# Patient Record
Sex: FEMALE | Race: WHITE | NOT HISPANIC OR LATINO
[De-identification: names, ages, dates, MRNs, and addresses within clinical notes are randomized per-mention and may not be internally consistent; named-entity substitution may affect disease eponyms.]

---

## 2021-03-18 ENCOUNTER — APPOINTMENT (OUTPATIENT)
Age: 31
End: 2021-03-18
Payer: COMMERCIAL

## 2021-03-18 PROCEDURE — 0001A: CPT

## 2021-12-20 ENCOUNTER — TRANSCRIPTION ENCOUNTER (OUTPATIENT)
Age: 31
End: 2021-12-20

## 2022-01-04 ENCOUNTER — TRANSCRIPTION ENCOUNTER (OUTPATIENT)
Age: 32
End: 2022-01-04

## 2022-01-06 ENCOUNTER — TRANSCRIPTION ENCOUNTER (OUTPATIENT)
Age: 32
End: 2022-01-06

## 2022-07-09 ENCOUNTER — NON-APPOINTMENT (OUTPATIENT)
Age: 32
End: 2022-07-09

## 2023-04-08 ENCOUNTER — NON-APPOINTMENT (OUTPATIENT)
Age: 33
End: 2023-04-08

## 2023-06-15 ENCOUNTER — NON-APPOINTMENT (OUTPATIENT)
Age: 33
End: 2023-06-15

## 2023-09-04 ENCOUNTER — NON-APPOINTMENT (OUTPATIENT)
Age: 33
End: 2023-09-04

## 2023-12-06 ENCOUNTER — NON-APPOINTMENT (OUTPATIENT)
Age: 33
End: 2023-12-06

## 2023-12-18 ENCOUNTER — NON-APPOINTMENT (OUTPATIENT)
Age: 33
End: 2023-12-18

## 2024-03-28 ENCOUNTER — NON-APPOINTMENT (OUTPATIENT)
Age: 34
End: 2024-03-28

## 2024-04-22 ENCOUNTER — NON-APPOINTMENT (OUTPATIENT)
Age: 34
End: 2024-04-22

## 2024-05-15 ENCOUNTER — TRANSCRIPTION ENCOUNTER (OUTPATIENT)
Age: 34
End: 2024-05-15

## 2024-05-15 NOTE — ASU PATIENT PROFILE, ADULT - NS PREOP UNDERSTANDS INFO
No solid food/dairy/candy/gum after 11:30pm tonight; water allowed before 04:30am; reminded to come with photo ID/insurance card; escort to have a photo ID; dress in comfortable clothes; no jewelries/contact lens; no smoking/drinking alcohol/illicit drug use today; address and callback number was given./yes

## 2024-05-15 NOTE — ASU PATIENT PROFILE, ADULT - REASON FOR ADMISSION, PROFILE
CT GUIDED, SUBMUCOUS RESECTION OF NASAL SEPTUM, ENDOSCOPIC SINUS SURGERY, TURBINATE FRACTURE,TURBINATE COBLATION,BILATERAL PARTIAL ETHMOIDECTOMY ,BILATERAL MAXILLARY ANTROSTOMY WITH TISSUE REMOVAL, BRAINLAB.

## 2024-05-15 NOTE — PRE PROCEDURE NOTE - PRE PROCEDURE EVALUATION
PRE ADMISSION NOTE  Diagnosis:   Deviated nasal septum, chronic sinusitis, headaches, turbinate hypertrophy  Planned Surgical Procedures:   smr-ct guided FESS-turb reduction  Indications for surgery and specialty history    34 yo female complains of severe chronic sinusitis with chronic nasal congestion and obstruction.  she has ahd headaches, postnasal drip. she has had at least 6 sinus infections the past year and resistant to antibiotics, OTC meds and steroid nasal spraysshe has treated hay fever  PE: deviated nasal septum, OMC polypoid, turbinate hypertrophy  CT scan: deviated nasal septum, chronic sinusitis, obstructing turbinates.     Medical History:  IBS, melanoma surgery, wisdom teeth extractions, migraines    Medications:  flonase, sumatriptan injfections, amg, dexmatheypheidate, claritin   Allergies  SULFA  sulfa drugs (Hives)  doxycycline (Unknown)    Intolerances    none    Special Comments:    Post op first visit given date and time: yes friday May 17 at 8 am    Patient called the evening before surgery: yes    Patient given all postop instructions and medications in advance of surgery: written and verbal instructions given       Consent in chart      Discussed all risks, benefits, complications, problems, realistic expectations, chance of recurrence, possible need for further surgery, need for patient follow-up, postop course etc discussed and fully understood.        All questions answered

## 2024-05-16 ENCOUNTER — TRANSCRIPTION ENCOUNTER (OUTPATIENT)
Age: 34
End: 2024-05-16

## 2024-05-16 ENCOUNTER — OUTPATIENT (OUTPATIENT)
Dept: OUTPATIENT SERVICES | Facility: HOSPITAL | Age: 34
LOS: 1 days | Discharge: HOME | End: 2024-05-16
Payer: COMMERCIAL

## 2024-05-16 VITALS
TEMPERATURE: 98 F | DIASTOLIC BLOOD PRESSURE: 58 MMHG | WEIGHT: 158.73 LBS | RESPIRATION RATE: 16 BRPM | SYSTOLIC BLOOD PRESSURE: 107 MMHG | OXYGEN SATURATION: 98 % | HEART RATE: 71 BPM | HEIGHT: 68 IN

## 2024-05-16 VITALS
DIASTOLIC BLOOD PRESSURE: 65 MMHG | OXYGEN SATURATION: 97 % | HEART RATE: 60 BPM | SYSTOLIC BLOOD PRESSURE: 118 MMHG | RESPIRATION RATE: 16 BRPM | TEMPERATURE: 98 F

## 2024-05-16 DIAGNOSIS — K08.409 PARTIAL LOSS OF TEETH, UNSPECIFIED CAUSE, UNSPECIFIED CLASS: Chronic | ICD-10-CM

## 2024-05-16 PROCEDURE — 88300 SURGICAL PATH GROSS: CPT | Mod: 26,59

## 2024-05-16 PROCEDURE — 88311 DECALCIFY TISSUE: CPT | Mod: 26

## 2024-05-16 PROCEDURE — 88305 TISSUE EXAM BY PATHOLOGIST: CPT | Mod: 26

## 2024-05-16 RX ORDER — SODIUM CHLORIDE 9 MG/ML
500 INJECTION, SOLUTION INTRAVENOUS
Refills: 0 | Status: DISCONTINUED | OUTPATIENT
Start: 2024-05-16 | End: 2024-05-16

## 2024-05-16 RX ORDER — ACETAMINOPHEN 500 MG
1000 TABLET ORAL ONCE
Refills: 0 | Status: COMPLETED | OUTPATIENT
Start: 2024-05-16 | End: 2024-05-16

## 2024-05-16 RX ORDER — FENTANYL CITRATE 50 UG/ML
50 INJECTION INTRAVENOUS
Refills: 0 | Status: DISCONTINUED | OUTPATIENT
Start: 2024-05-16 | End: 2024-05-16

## 2024-05-16 RX ORDER — DEXMETHYLPHENIDATE HYDROCHLORIDE 35 MG/1
1 CAPSULE, EXTENDED RELEASE ORAL
Refills: 0 | DISCHARGE

## 2024-05-16 RX ORDER — SODIUM CHLORIDE 9 MG/ML
500 INJECTION, SOLUTION INTRAVENOUS ONCE
Refills: 0 | Status: COMPLETED | OUTPATIENT
Start: 2024-05-16 | End: 2024-05-16

## 2024-05-16 RX ORDER — SUMATRIPTAN SUCCINATE 4 MG/.5ML
1 INJECTION, SOLUTION SUBCUTANEOUS
Refills: 0 | DISCHARGE

## 2024-05-16 RX ORDER — HYDROMORPHONE HYDROCHLORIDE 2 MG/ML
0.5 INJECTION INTRAMUSCULAR; INTRAVENOUS; SUBCUTANEOUS
Refills: 0 | Status: DISCONTINUED | OUTPATIENT
Start: 2024-05-16 | End: 2024-05-16

## 2024-05-16 RX ORDER — APREPITANT 80 MG/1
40 CAPSULE ORAL ONCE
Refills: 0 | Status: COMPLETED | OUTPATIENT
Start: 2024-05-16 | End: 2024-05-16

## 2024-05-16 RX ORDER — RACEPINEPHRINE HCL 2.25 %
1 SOLUTION, NON-ORAL TOPICAL ONCE
Refills: 0 | Status: DISCONTINUED | OUTPATIENT
Start: 2024-05-16 | End: 2024-05-16

## 2024-05-16 RX ORDER — OXYCODONE HYDROCHLORIDE 5 MG/1
5 TABLET ORAL ONCE
Refills: 0 | Status: DISCONTINUED | OUTPATIENT
Start: 2024-05-16 | End: 2024-05-16

## 2024-05-16 RX ADMIN — APREPITANT 40 MILLIGRAM(S): 80 CAPSULE ORAL at 06:24

## 2024-05-16 RX ADMIN — SODIUM CHLORIDE 130 MILLILITER(S): 9 INJECTION, SOLUTION INTRAVENOUS at 10:11

## 2024-05-16 RX ADMIN — Medication 1000 MILLIGRAM(S): at 06:24

## 2024-05-16 RX ADMIN — SODIUM CHLORIDE 500 MILLILITER(S): 9 INJECTION, SOLUTION INTRAVENOUS at 10:10

## 2024-05-16 NOTE — ASU DISCHARGE PLAN (ADULT/PEDIATRIC) - PAIN MANAGEMENT
How Many Skin Cancers Have You Had?: one What Is The Reason For Today's Visit?: History of Non-Melanoma Skin Cancer Prescriptions electronically submitted to pharmacy from doctor's office

## 2024-05-16 NOTE — BRIEF OPERATIVE NOTE - OPERATION/FINDINGS
1. sever3e deviated nasal septum left  more than  right  2. polypoid ant ethmoid sinsues  3. blocked max sinsues  and polypoid  4. right max mucopus  5. huge inf  turbs

## 2024-05-16 NOTE — ASU DISCHARGE PLAN (ADULT/PEDIATRIC) - CARE PROVIDER_API CALL
Lew Bradford  Otolaryngology  [facility] [address] [place] [address], [place] [address]  Phone: [phone]  Fax: [phone]  Established Patient  Scheduled Appointment: 05/17/2024 08:00 AM

## 2024-05-16 NOTE — BRIEF OPERATIVE NOTE - NSICDXBRIEFPROCEDURE_GEN_ALL_CORE_FT
PROCEDURES:  SMR - Submucous resection 16-May-2024 07:46:01  Lew Bradford  FESS, adult 16-May-2024 07:46:05  Lew Bradford  CT guided localization 16-May-2024 07:46:10  Lew Bradford  Surgical fracture of inferior nasal turbinate 16-May-2024 07:46:17  Lew Bradford  Reduction, turbinate, using Coblation technique 16-May-2024 07:46:25  Lew Bradford

## 2024-05-16 NOTE — ASU DISCHARGE PLAN (ADULT/PEDIATRIC) - NS MD DC FALL RISK RISK
For information on Fall & Injury Prevention, visit: [email or web address] OR  [email or web address] OR  https://www.cdc.gov/steadi/patient.html

## 2024-05-16 NOTE — ASU DISCHARGE PLAN (ADULT/PEDIATRIC) - PROVIDER TOKENS
PROVIDER:[TOKEN:[[id number]],SCHEDULEDAPPT:[05/17/2024],SCHEDULEDAPPTTIME:[08:00 AM],ESTABLISHEDPATIENT:[T]]

## 2024-05-16 NOTE — BRIEF OPERATIVE NOTE - NSICDXBRIEFPREOP_GEN_ALL_CORE_FT
PRE-OP DIAGNOSIS:  Hypertrophy of inferior nasal turbinate 16-May-2024 07:45:24  Lew Bradford  Chronic headaches 16-May-2024 07:45:18  Lew Bradford  Chronic sinusitis 16-May-2024 07:45:13  Lew Bradford  Deviated nasal septum 16-May-2024 07:45:04  Lew Bradford

## 2024-05-16 NOTE — BRIEF OPERATIVE NOTE - NSICDXBRIEFPOSTOP_GEN_ALL_CORE_FT
POST-OP DIAGNOSIS:  Deviated nasal septum 16-May-2024 07:45:53  Lew Bradford  Chronic sinusitis 16-May-2024 07:45:45  Lew Bradford  Chronic headaches 16-May-2024 07:45:38  Lew Bradford  Hypertrophy of inferior nasal turbinate 16-May-2024 07:45:31  Lew Bradford

## 2024-05-16 NOTE — PRE-ANESTHESIA EVALUATION ADULT - NSANTHADDINFOFT_GEN_ALL_CORE
Pt accepts all anesthesia risks .IBNLT: Dental, Pharyngeal, Laryngeal, Tracheal Trauma, Sore Throat, Hoarseness, Recurrent Laryngeal Nerve Dysfunction, Upper and Lower Extremity Paresthesias, Nausea and Vomiting, Potential exacerbation of asthma and HARRIETT.

## 2024-05-22 LAB
CULTURE RESULTS: ABNORMAL
SPECIMEN SOURCE: SIGNIFICANT CHANGE UP

## 2024-05-23 LAB — SURGICAL PATHOLOGY STUDY: SIGNIFICANT CHANGE UP

## 2024-06-20 ENCOUNTER — NON-APPOINTMENT (OUTPATIENT)
Age: 34
End: 2024-06-20

## 2024-07-16 ENCOUNTER — NON-APPOINTMENT (OUTPATIENT)
Age: 34
End: 2024-07-16

## 2024-11-01 ENCOUNTER — NON-APPOINTMENT (OUTPATIENT)
Age: 34
End: 2024-11-01

## 2025-02-01 ENCOUNTER — NON-APPOINTMENT (OUTPATIENT)
Age: 35
End: 2025-02-01

## 2025-02-17 ENCOUNTER — NON-APPOINTMENT (OUTPATIENT)
Age: 35
End: 2025-02-17

## 2025-04-03 ENCOUNTER — NON-APPOINTMENT (OUTPATIENT)
Age: 35
End: 2025-04-03

## 2025-04-03 ENCOUNTER — APPOINTMENT (OUTPATIENT)
Dept: OPHTHALMOLOGY | Facility: CLINIC | Age: 35
End: 2025-04-03
Payer: COMMERCIAL

## 2025-04-03 PROCEDURE — 92083 EXTENDED VISUAL FIELD XM: CPT

## 2025-04-03 PROCEDURE — 92250 FUNDUS PHOTOGRAPHY W/I&R: CPT

## 2025-04-03 PROCEDURE — 92014 COMPRE OPH EXAM EST PT 1/>: CPT

## 2025-04-09 ENCOUNTER — NON-APPOINTMENT (OUTPATIENT)
Age: 35
End: 2025-04-09

## 2025-04-09 ENCOUNTER — APPOINTMENT (OUTPATIENT)
Dept: OPHTHALMOLOGY | Facility: CLINIC | Age: 35
End: 2025-04-09
Payer: COMMERCIAL

## 2025-04-09 PROCEDURE — 92285 EXTERNAL OCULAR PHOTOGRAPHY: CPT

## 2025-04-09 PROCEDURE — 99204 OFFICE O/P NEW MOD 45 MIN: CPT

## 2025-04-09 PROCEDURE — 92250 FUNDUS PHOTOGRAPHY W/I&R: CPT

## 2025-05-09 ENCOUNTER — APPOINTMENT (OUTPATIENT)
Age: 35
End: 2025-05-09

## (undated) DEVICE — DRSG FOAM STAMMBERGER SINUS

## (undated) DEVICE — Device

## (undated) DEVICE — TUBING SUCTION NONCONDUCTIVE 6MM X 12FT

## (undated) DEVICE — MEDIA ESWAB W/ REGULAR FLOCKED PK/50

## (undated) DEVICE — GLV 7.5 PROTEXIS (WHITE)

## (undated) DEVICE — VAGINAL PACKING 2"

## (undated) DEVICE — DRSG KLING 4"

## (undated) DEVICE — DRSG TELFA 3 X 8

## (undated) DEVICE — ACCLARENT SET INFLATION DEVICE

## (undated) DEVICE — DRSG 2X2

## (undated) DEVICE — PETRI DISH MED 3.5"

## (undated) DEVICE — WARMING BLANKET LOWER ADULT

## (undated) DEVICE — DRAPE MAYO STAND 23"

## (undated) DEVICE — DRSG TAPE MICROPORE SURGICAL TAPE .5"X10 YDS TAN

## (undated) DEVICE — DRSG MEROCEL STANDARD NO STRING 8CM X 2CM

## (undated) DEVICE — TUBING DIEGO DECLOG

## (undated) DEVICE — SHAVER BLADE OLYMPUS DIEGO ELITE 4MM STRAIGHT

## (undated) DEVICE — BRAINLAB HEADBAND DISP

## (undated) DEVICE — PACK RHINOPLASTY

## (undated) DEVICE — SPHERE MARKER FOR BRAIN LAB IMAGE (3 SPHERES)

## (undated) DEVICE — SUT SILK 2-0 18" FS

## (undated) DEVICE — TONGUE DEPRESSOR

## (undated) DEVICE — DRSG MEROCEL STANDARD W STRING 8CM

## (undated) DEVICE — MARKING PEN W RULER

## (undated) DEVICE — NDL SPINAL 22G X 3.5" (BLACK)

## (undated) DEVICE — DRSG GAUZE SPONGE 2X2" STERILE

## (undated) DEVICE — SUT CHROMIC 4-0 18" G-3

## (undated) DEVICE — S&N ARTHROCARE ENT WAND REFLEX ULTRA PTR

## (undated) DEVICE — ELCTR BOVIE SUCTION 8FR 6"

## (undated) DEVICE — VENODYNE/SCD SLEEVE CALF MEDIUM

## (undated) DEVICE — BLADE SCALPEL SAFETY #15 WITH PLASTIC GREEN HANDLE

## (undated) DEVICE — SOL ANTI FOG